# Patient Record
Sex: FEMALE | Race: WHITE | NOT HISPANIC OR LATINO | Employment: UNEMPLOYED | URBAN - METROPOLITAN AREA
[De-identification: names, ages, dates, MRNs, and addresses within clinical notes are randomized per-mention and may not be internally consistent; named-entity substitution may affect disease eponyms.]

---

## 2019-06-10 ENCOUNTER — TELEPHONE (OUTPATIENT)
Dept: OBSTETRICS AND GYNECOLOGY | Facility: CLINIC | Age: 44
End: 2019-06-10

## 2019-06-10 NOTE — TELEPHONE ENCOUNTER
----- Message from Tamika Lira sent at 6/10/2019  9:25 AM CDT -----  Contact: Pt Fiordaliza ( Mother )   Pt mother calling in to schedule her daughter an appointment for a Well Women Exam.   She can be reached at [phone] thanks

## 2019-06-10 NOTE — TELEPHONE ENCOUNTER
Spoke to patients mother and let her know that patient needs a referral to schedule an appointment due to her insurance. Patient is considered a new patient. Patients mother verbalized understanding.

## 2019-09-04 ENCOUNTER — TELEPHONE (OUTPATIENT)
Dept: OBSTETRICS AND GYNECOLOGY | Facility: CLINIC | Age: 44
End: 2019-09-04

## 2019-09-04 NOTE — TELEPHONE ENCOUNTER
----- Message from Lawanda Wheeler sent at 9/4/2019  4:21 PM CDT -----  Contact: Mom-Fiordaliza Weber is requesting call back in regards to complications with birthcontrol.          [facility] call back at [phone]

## 2019-09-17 ENCOUNTER — OFFICE VISIT (OUTPATIENT)
Dept: OBSTETRICS AND GYNECOLOGY | Facility: CLINIC | Age: 44
End: 2019-09-17
Payer: MEDICAID

## 2019-09-17 VITALS
SYSTOLIC BLOOD PRESSURE: 120 MMHG | BODY MASS INDEX: 33.88 KG/M2 | HEIGHT: 61 IN | WEIGHT: 179.44 LBS | DIASTOLIC BLOOD PRESSURE: 80 MMHG

## 2019-09-17 DIAGNOSIS — N94.6 DYSMENORRHEA: ICD-10-CM

## 2019-09-17 DIAGNOSIS — Z12.4 CERVICAL CANCER SCREENING: ICD-10-CM

## 2019-09-17 DIAGNOSIS — N93.9 ABNORMAL UTERINE BLEEDING: Primary | ICD-10-CM

## 2019-09-17 PROCEDURE — 99204 OFFICE O/P NEW MOD 45 MIN: CPT | Mod: 25,S$PBB,, | Performed by: OBSTETRICS & GYNECOLOGY

## 2019-09-17 PROCEDURE — 99213 OFFICE O/P EST LOW 20 MIN: CPT | Mod: PBBFAC,25 | Performed by: OBSTETRICS & GYNECOLOGY

## 2019-09-17 PROCEDURE — 88305 TISSUE EXAM BY PATHOLOGIST: CPT | Mod: 26,,, | Performed by: PATHOLOGY

## 2019-09-17 PROCEDURE — 88305 TISSUE EXAM BY PATHOLOGIST: CPT | Performed by: PATHOLOGY

## 2019-09-17 PROCEDURE — 58100 ENDOMETRIAL BIOPSY: ICD-10-PCS | Mod: S$PBB,,, | Performed by: OBSTETRICS & GYNECOLOGY

## 2019-09-17 PROCEDURE — 99999 PR PBB SHADOW E&M-EST. PATIENT-LVL III: CPT | Mod: PBBFAC,,, | Performed by: OBSTETRICS & GYNECOLOGY

## 2019-09-17 PROCEDURE — 58100 BIOPSY OF UTERUS LINING: CPT | Mod: PBBFAC | Performed by: OBSTETRICS & GYNECOLOGY

## 2019-09-17 PROCEDURE — 88305 TISSUE SPECIMEN TO PATHOLOGY, OBSTETRICS/GYNECOLOGY: ICD-10-PCS | Mod: 26,,, | Performed by: PATHOLOGY

## 2019-09-17 PROCEDURE — 99999 PR PBB SHADOW E&M-EST. PATIENT-LVL III: ICD-10-PCS | Mod: PBBFAC,,, | Performed by: OBSTETRICS & GYNECOLOGY

## 2019-09-17 PROCEDURE — 88175 CYTOPATH C/V AUTO FLUID REDO: CPT

## 2019-09-17 PROCEDURE — 87624 HPV HI-RISK TYP POOLED RSLT: CPT

## 2019-09-17 PROCEDURE — 99204 PR OFFICE/OUTPT VISIT, NEW, LEVL IV, 45-59 MIN: ICD-10-PCS | Mod: 25,S$PBB,, | Performed by: OBSTETRICS & GYNECOLOGY

## 2019-09-17 RX ORDER — CETIRIZINE HYDROCHLORIDE 10 MG/1
10 TABLET ORAL DAILY
Refills: 3 | COMMUNITY
Start: 2019-08-04

## 2019-09-17 RX ORDER — FLUTICASONE PROPIONATE 50 MCG
SPRAY, SUSPENSION (ML) NASAL
Refills: 3 | COMMUNITY
Start: 2019-08-14

## 2019-09-17 RX ORDER — CLONAZEPAM 0.5 MG/1
TABLET ORAL
Refills: 2 | COMMUNITY
Start: 2019-09-06

## 2019-09-17 RX ORDER — BUSPIRONE HYDROCHLORIDE 15 MG/1
15 TABLET ORAL 2 TIMES DAILY
Refills: 2 | COMMUNITY
Start: 2019-08-13 | End: 2020-02-18

## 2019-09-17 NOTE — PROCEDURES
Endometrial biopsy  Date/Time: 9/17/2019 11:46 AM  Performed by: Kimberly Tsang MD  Authorized by: Kimberly Tsang MD   Preparation: Patient was prepped and draped in the usual sterile fashion.  Local anesthesia used: no    Anesthesia:  Local anesthesia used: no    Sedation:  Patient sedated: no    Patient tolerance: Patient tolerated the procedure well with no immediate complications  Comments: Endometrial Biopsy Procedure Note    Name of Provider: Dr. Kimberly Tsang  Procedure Details  Urine pregnancy test negative  The risks (including infection, bleeding, pain, and uterine perforation) and benefits of the procedure were explained to the patient and Verbal informed consent was obtained.  Time out was performed.    The patient was placed in the dorsal lithotomy position.  Bimanual exam showed the uterus to be in the anteroflexed position.  A Graves' speculum inserted in the vagina, and the cervix prepped with povidone iodine.       A sharp tenaculum was applied to the anterior lip of the cervix for stabilization.  A Pipelle endometrial aspirator was used to sound the uterus to a depth of 10cm.  It was then used to sample the endometrium.  1 pass was made.  Sample was sent for pathologic examination.    Condition:  Stable    Complications:  None    Plan:    The patient was advised to call for any fever or for prolonged or severe pain or bleeding. She was advised to use OTC acetaminophen as needed for mild to moderate pain. She was advised to avoid vaginal intercourse for 48 hours or until the bleeding has completely stopped.      Specimen: Endometrial curettings    Post Op Diagnosis: Abnormal uterine bleeding

## 2019-09-17 NOTE — PATIENT INSTRUCTIONS
Endometrial Biopsy    Endometrial biopsy is a procedure used to study the endometrium (lining of the uterus). It is usually done in your health care providers office. During the biopsy, small tissue samples are taken from inside the uterus. These are then sent to a lab for study. If any problems are found, you and your health care provider will discuss treatment options. The biopsy usually takes only a few minutes, and you can often go back to your normal routine as soon as the procedure is over.  Reasons for the procedure  Endometrial biopsy may help pinpoint the cause of certain problems. These include:  · Bleeding after menopause  · Heavy or irregular menstrual periods  · Bleeding associated with hormone therapy  · Prolonged bleeding  · Abnormal Pap test results  · Having certain types of cancer  · Trouble getting pregnant (fertility problems)  What are the risks?  Problems with endometrial biopsy are rare, but can include:  · Bleeding  · Infection  · Damage to the uterine wall (very rare)  Getting ready for the procedure  Your health care provider will ask about your health and any medicines you take, like blood thinners. Before your biopsy, you may have tests to make sure youre not pregnant or have an infection. You may also be asked to sign a consent form. A day or 2 before the procedure:   · Avoid using creams or other vaginal medicines.  · Avoid douching.  · Ask your health care provider if you should take pain medicines shortly before the test.  During the biopsy  During the biopsy, you will likely experience the following:  · You will be asked to lie on an exam table with your knees bent, just as you do for a Pap test.  · You may have a brief pelvic exam. An instrument called a speculum is then inserted into the vagina to hold it open.  · An antiseptic solution may be applied to the cervix. The cervix may also be numbed with an anesthetic or dilated to widen the opening.  · A small tube is passed  through the cervix into the uterus.  · It is normal to feel some cramping when the tube is inserted. But tell your health care provider if you have severe cramping or are very uncomfortable.  · Using mild suction, samples are taken from the uterine lining. You may feel pinching or additional cramping when this is done.  · The tube and speculum are then removed and the samples are sent to a lab for study.  After the procedure  After the procedure, you may experience the following:  · If you feel lightheaded or dizzy, you can rest on the table until youre ready to get dressed.  · For a few hours, you may feel some mild cramping. This can usually be relieved with over-the-counter pain medicines.  · You may have some bleeding for a few days. Use pads instead of tampons.  · Dont douche or use any vaginal medicines unless your health care provider says its OK.  · Ask your health care provider when its OK to have sex again.  Follow-up care  It will take about a week for the biopsy results to come back from the lab. Then you and your health care provider can discuss the results. These may show that no treatment is required. Or, you may be scheduled for a follow-up appointment and more tests. If your biopsy was done for fertility problems, be sure to record the day when your next period begins.     Call your health care provider   Contact your health care provider if you have any of the following:  · Heavy bleeding (more than a pad an hour for 2 hours).  · Severe cramping or increasing pain.  · Fever over 100.4°F (38.0°C)  · Foul-smelling or unusual vaginal discharge.   Date Last Reviewed: 5/10/2015  © 3368-9735 [employer]. [address] [place] [address], [place] [address]. All rights reserved. This information is not intended as a substitute for professional medical care. Always follow your healthcare professional's instructions.

## 2019-09-17 NOTE — PROGRESS NOTES
Subjective:       Patient ID: Martha Kemp is a 44 y.o. female.    Chief Complaint:  Metrorrhagia      History of Present Illness  HPI  Presents with her mom for irregular periods and pelvic pain.  The patient had been having regular, monthly periods until 3 months ago.  She now is having intermenstrual spotting along with pelvic pain on days she has the bleeding.  She is not currently sexually active.  Her operative history was reviewed in detail.  Essentially, she has an Essure coil in the right fallopian tube, then subsequent tubal ligation of the left fallopian tube during .  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    Last pap: several years ago: normal  States she had a [facility] within the last year at [facility]    GYN & OB History  Patient's last menstrual period was 09/10/2019.   Date of Last Pap: No result found    OB History    Para Term  AB Living   1 1 1     1   SAB TAB Ectopic Multiple Live Births           1      # Outcome Date GA Lbr Lauri/2nd Weight Sex Delivery Anes PTL Lv   1 Term 2014     CS-LTranv   RE       Review of Systems  Review of Systems   Constitutional: Negative for fatigue, fever and unexpected weight change.   Gastrointestinal: Negative for abdominal pain, constipation, diarrhea, nausea and vomiting.   Genitourinary: Positive for dysmenorrhea, menstrual problem and pelvic pain. Negative for dysuria, frequency, urgency, vaginal bleeding, vaginal discharge, vaginal pain and vaginal odor.           Objective:    Physical Exam:   Constitutional: She is oriented to person, place, and time. She appears well-developed and well-nourished. No distress.             Abdominal: Soft. She exhibits no distension and no mass. There is no tenderness. There is no rebound and no guarding. Hernia confirmed negative in the right inguinal area and confirmed negative in the left inguinal area.     Genitourinary: Vagina normal.  Pelvic exam was performed with patient supine. There is no rash, tenderness, lesion or injury on the right labia. There is no rash, tenderness, lesion or injury on the left labia. Uterus is not deviated, not enlarged, not fixed, not tender and not experiencing uterine prolapse. Right adnexum displays no mass, no tenderness and no fullness. Left adnexum displays no mass, no tenderness and no fullness. No erythema, tenderness, bleeding, rectocele, cystocele or unspecified prolapse of vaginal walls in the vagina. No foreign body in the vagina. No signs of injury around the vagina. No vaginal discharge found. Cervix exhibits no motion tenderness, no discharge and no friability. Additional cervical findings: pap smear done       Uterus Size: 10 cm       Neurological: She is alert and oriented to person, place, and time.     Psychiatric: She has a normal mood and affect.        UPT: negative  Assessment:        1. Abnormal uterine bleeding    2. Dysmenorrhea    3. Cervical cancer screening                Plan:      Martha was seen today for metrorrhagia.    Diagnoses and all orders for this visit:    Abnormal uterine bleeding  -     Tissue Specimen To Pathology, Obstetrics/Gynecology  -     Endometrial biopsy  -     US Pelvis Complete Non OB; Future    Dysmenorrhea  -     US Pelvis Complete Non OB; Future    Cervical cancer screening  -     Liquid-based pap smear, screening  -     HPV High Risk Genotypes, PCR    EMB done today.  RTC with her mother to review EMB and ultrasound results, and to discuss further management of her symptoms.

## 2019-09-20 LAB
HPV HR 12 DNA CVX QL NAA+PROBE: NEGATIVE
HPV16 AG SPEC QL: NEGATIVE
HPV18 DNA SPEC QL NAA+PROBE: NEGATIVE

## 2019-09-27 ENCOUNTER — TELEPHONE (OUTPATIENT)
Dept: RADIOLOGY | Facility: HOSPITAL | Age: 44
End: 2019-09-27

## 2019-09-30 ENCOUNTER — HOSPITAL ENCOUNTER (OUTPATIENT)
Dept: RADIOLOGY | Facility: HOSPITAL | Age: 44
Discharge: HOME | End: 2019-09-30
Attending: OBSTETRICS & GYNECOLOGY
Payer: MEDICAID

## 2019-09-30 ENCOUNTER — TELEPHONE (OUTPATIENT)
Dept: OBSTETRICS AND GYNECOLOGY | Facility: CLINIC | Age: 44
End: 2019-09-30

## 2019-09-30 DIAGNOSIS — N93.9 ABNORMAL UTERINE BLEEDING: ICD-10-CM

## 2019-09-30 DIAGNOSIS — N94.6 DYSMENORRHEA: ICD-10-CM

## 2019-09-30 PROCEDURE — 76830 TRANSVAGINAL US NON-OB: CPT | Mod: TC

## 2019-09-30 PROCEDURE — 76856 US EXAM PELVIC COMPLETE: CPT | Mod: 26,,, | Performed by: RADIOLOGY

## 2019-09-30 PROCEDURE — 76856 US PELVIS COMP WITH TRANSVAG NON-OB (XPD): ICD-10-PCS | Mod: 26,,, | Performed by: RADIOLOGY

## 2019-09-30 PROCEDURE — 76830 TRANSVAGINAL US NON-OB: CPT | Mod: 26,,, | Performed by: RADIOLOGY

## 2019-09-30 PROCEDURE — 76830 US PELVIS COMP WITH TRANSVAG NON-OB (XPD): ICD-10-PCS | Mod: 26,,, | Performed by: RADIOLOGY

## 2019-09-30 NOTE — TELEPHONE ENCOUNTER
Pt came in today for an ultrasound. She states she started spotting earlier and wanted to consult with a doctor to see if she need to be seen sooner. Pt is s

## 2019-10-10 ENCOUNTER — OFFICE VISIT (OUTPATIENT)
Dept: OBSTETRICS AND GYNECOLOGY | Facility: CLINIC | Age: 44
End: 2019-10-10
Payer: MEDICAID

## 2019-10-10 VITALS
SYSTOLIC BLOOD PRESSURE: 124 MMHG | HEIGHT: 61 IN | DIASTOLIC BLOOD PRESSURE: 72 MMHG | WEIGHT: 180.56 LBS | BODY MASS INDEX: 34.09 KG/M2

## 2019-10-10 DIAGNOSIS — N94.6 DYSMENORRHEA: ICD-10-CM

## 2019-10-10 DIAGNOSIS — N83.292 COMPLEX CYST OF LEFT OVARY: ICD-10-CM

## 2019-10-10 DIAGNOSIS — N93.9 ABNORMAL UTERINE BLEEDING: Primary | ICD-10-CM

## 2019-10-10 PROCEDURE — 99212 OFFICE O/P EST SF 10 MIN: CPT | Mod: S$PBB,,, | Performed by: OBSTETRICS & GYNECOLOGY

## 2019-10-10 PROCEDURE — 99999 PR PBB SHADOW E&M-EST. PATIENT-LVL III: CPT | Mod: PBBFAC,,, | Performed by: OBSTETRICS & GYNECOLOGY

## 2019-10-10 PROCEDURE — 99999 PR PBB SHADOW E&M-EST. PATIENT-LVL III: ICD-10-PCS | Mod: PBBFAC,,, | Performed by: OBSTETRICS & GYNECOLOGY

## 2019-10-10 PROCEDURE — 99213 OFFICE O/P EST LOW 20 MIN: CPT | Mod: PBBFAC | Performed by: OBSTETRICS & GYNECOLOGY

## 2019-10-10 PROCEDURE — 99212 PR OFFICE/OUTPT VISIT, EST, LEVL II, 10-19 MIN: ICD-10-PCS | Mod: S$PBB,,, | Performed by: OBSTETRICS & GYNECOLOGY

## 2019-10-10 RX ORDER — MEDROXYPROGESTERONE ACETATE 10 MG/1
10 TABLET ORAL DAILY
Qty: 10 TABLET | Refills: 2 | Status: SHIPPED | OUTPATIENT
Start: 2019-10-10 | End: 2020-02-18

## 2019-10-10 NOTE — PROGRESS NOTES
"  Subjective:       Patient ID: Martha Kemp is a 44 y.o. female.    Chief Complaint:  Follow-up      History of Present Illness  HPI  Presents to follow-up results.   To recap, at her last visit: "Presents with her mom for irregular periods and pelvic pain.  The patient had been having regular, monthly periods until 3 months ago.  She now is having intermenstrual spotting along with pelvic pain on days she has the bleeding.  She is not currently sexually active.  Her operative history was reviewed in detail.  Essentially, she has an Essure coil in the right fallopian tube, then subsequent tubal ligation of the left fallopian tube during .  She has significant pelvic adhesive disease on operative findings."    EMB at her last visit was benign with glandular breakdown  Pelvic ultrasound with 2.9 cm intramural fibroid, and 5.6 cm left ovarian cyst c/w hemorrhagic cyst    GYN & OB History  Patient's last menstrual period was 09/10/2019.   Date of Last Pap: 2019    OB History    Para Term  AB Living   1 1 1     1   SAB TAB Ectopic Multiple Live Births           1      # Outcome Date GA Lbr Lauri/2nd Weight Sex Delivery Anes PTL Lv   1 Term 2014     CS-LTranv   RE       Review of Systems  Review of Systems   Constitutional: Negative for fatigue, fever and unexpected weight change.   Gastrointestinal: Negative for abdominal pain, constipation, diarrhea, nausea and vomiting.   Genitourinary: Positive for menstrual problem. Negative for dysuria, frequency, urgency, vaginal bleeding, vaginal discharge, vaginal pain, postcoital bleeding and vaginal odor.           Objective:    Physical Exam:   Constitutional: She is oriented to person, place, and time. She appears well-developed and well-nourished. No distress.                           Neurological: She is alert and oriented to person, place, and time.    Skin: No rash noted. No erythema. No pallor.    Psychiatric:   Labile affect        "   Assessment:        1. Abnormal uterine bleeding    2. Dysmenorrhea    3. Complex cyst of left ovary                Plan:      Martha was seen today for follow-up.    Diagnoses and all orders for this visit:    Abnormal uterine bleeding  -     medroxyPROGESTERone (PROVERA) 10 MG tablet; Take 1 tablet (10 mg total) by mouth once daily. for 10 days    Dysmenorrhea    Complex cyst of left ovary  -     US Pelvis Complete Non OB; Future    Reviewed results with the patient and her mother.  Discussed dysfunctional uterine bleeding.  Reviewed options for cyclic provera versus OCP's.  Patient does not want birth control, but is willing to try cyclic provera.  Will do that x 3 months, then RTC with me.  Repeat ultrasound 6-8 weeks from first ultrasound.

## 2019-11-19 ENCOUNTER — TELEPHONE (OUTPATIENT)
Dept: OBSTETRICS AND GYNECOLOGY | Facility: CLINIC | Age: 44
End: 2019-11-19

## 2019-11-19 NOTE — TELEPHONE ENCOUNTER
----- Message from Mansoor Esteves sent at 11/19/2019  9:08 AM CST -----  Contact: Bia-mother  Type:  Sooner Apoointment Request    Caller is requesting a sooner appointment.  Caller declined first available appointment listed below.  Caller will not accept being placed on the waitlist and is requesting a message be sent to doctor.  Name of Caller: Bia  When is the first available appointment? 05/06/20  Symptoms: f/u  Would the patient rather a call back or a response via [facility]? Call back  Best Call Back Number: [phone]  Additional Information: n/a

## 2019-11-19 NOTE — TELEPHONE ENCOUNTER
Returned pt call, spoke with Ms. Montiel (pt's mother) who stated that she had to cancel an appt for her daughter w/ Dr. Tsang to attend an urgent appt with their optometrist. Ms. Montiel was concerned that their appt on 2/6/20 was too late for a follow up. Informed pt's mother that I would check with Dr. Tsang and see if she needs to be seen sooner. Pt verbalized understanding.

## 2019-11-20 ENCOUNTER — TELEPHONE (OUTPATIENT)
Dept: OBSTETRICS AND GYNECOLOGY | Facility: CLINIC | Age: 44
End: 2019-11-20

## 2019-11-20 NOTE — TELEPHONE ENCOUNTER
Returned call.  Mother Bia answered and scheduled ultrasound appt for 12/11/19 at 10:15 am.  Ultrasound instructions given, she verbalized understanding and confirmed appt along with location.

## 2019-11-20 NOTE — TELEPHONE ENCOUNTER
----- Message from Claudia Mcarthur sent at 11/20/2019  2:40 PM CST -----  Contact: Patient  Type:  Patient Returning Call    Who Called:  Patient  Who Left Message for Patient:  Jesica  Does the patient know what this is regarding?:  no  Best Call Back Number: [phone] (home) [phone] (work)   Additional Information:  Patient states that she is scheduled and is not sure why she is receiving calls. Thank you!

## 2019-12-10 ENCOUNTER — TELEPHONE (OUTPATIENT)
Dept: RADIOLOGY | Facility: HOSPITAL | Age: 44
End: 2019-12-10

## 2019-12-11 ENCOUNTER — HOSPITAL ENCOUNTER (OUTPATIENT)
Dept: RADIOLOGY | Facility: HOSPITAL | Age: 44
Discharge: HOME | End: 2019-12-11
Attending: OBSTETRICS & GYNECOLOGY
Payer: MEDICAID

## 2019-12-11 DIAGNOSIS — N83.292 COMPLEX CYST OF LEFT OVARY: ICD-10-CM

## 2020-02-03 ENCOUNTER — TELEPHONE (OUTPATIENT)
Dept: RADIOLOGY | Facility: HOSPITAL | Age: 45
End: 2020-02-03

## 2020-02-04 ENCOUNTER — APPOINTMENT (OUTPATIENT)
Dept: RADIOLOGY | Facility: HOSPITAL | Age: 45
End: 2020-02-04
Attending: OBSTETRICS & GYNECOLOGY
Payer: MEDICAID

## 2020-02-04 PROCEDURE — 76830 TRANSVAGINAL US NON-OB: CPT | Mod: TC,PO

## 2020-02-04 PROCEDURE — 76856 US PELVIS COMP WITH TRANSVAG NON-OB (XPD): ICD-10-PCS | Mod: 26,,, | Performed by: RADIOLOGY

## 2020-02-04 PROCEDURE — 76830 TRANSVAGINAL US NON-OB: CPT | Mod: 26,,, | Performed by: RADIOLOGY

## 2020-02-04 PROCEDURE — 76830 US PELVIS COMP WITH TRANSVAG NON-OB (XPD): ICD-10-PCS | Mod: 26,,, | Performed by: RADIOLOGY

## 2020-02-04 PROCEDURE — 76856 US EXAM PELVIC COMPLETE: CPT | Mod: 26,,, | Performed by: RADIOLOGY

## 2020-02-05 ENCOUNTER — TELEPHONE (OUTPATIENT)
Dept: OBSTETRICS AND GYNECOLOGY | Facility: CLINIC | Age: 45
End: 2020-02-05

## 2020-02-05 DIAGNOSIS — N83.201 RIGHT OVARIAN CYST: Primary | ICD-10-CM

## 2020-02-05 PROBLEM — N83.292 COMPLEX CYST OF LEFT OVARY: Status: RESOLVED | Noted: 2019-10-10 | Resolved: 2020-02-05

## 2020-02-05 NOTE — TELEPHONE ENCOUNTER
Pelvic ultrasound shows resolution of the left ovarian cyst.  Now has a new cyst on the right ovary.  Please notify.  Needs repeat ultrasound in 8 weeks.

## 2020-02-07 NOTE — TELEPHONE ENCOUNTER
Mother answered the phone and stated that she was called yesterday and given the results of patient's ultrasound.

## 2020-02-18 ENCOUNTER — OFFICE VISIT (OUTPATIENT)
Dept: OBSTETRICS AND GYNECOLOGY | Facility: CLINIC | Age: 45
End: 2020-02-18
Payer: MEDICAID

## 2020-02-18 VITALS
DIASTOLIC BLOOD PRESSURE: 72 MMHG | BODY MASS INDEX: 37.17 KG/M2 | HEIGHT: 61 IN | SYSTOLIC BLOOD PRESSURE: 118 MMHG | WEIGHT: 196.88 LBS

## 2020-02-18 DIAGNOSIS — Z12.39 BREAST CANCER SCREENING: ICD-10-CM

## 2020-02-18 DIAGNOSIS — N94.6 DYSMENORRHEA: ICD-10-CM

## 2020-02-18 DIAGNOSIS — N83.201 RIGHT OVARIAN CYST: Primary | ICD-10-CM

## 2020-02-18 PROBLEM — N93.9 ABNORMAL UTERINE BLEEDING: Status: RESOLVED | Noted: 2019-09-17 | Resolved: 2020-02-18

## 2020-02-18 PROCEDURE — 99212 PR OFFICE/OUTPT VISIT, EST, LEVL II, 10-19 MIN: ICD-10-PCS | Mod: S$PBB,,, | Performed by: OBSTETRICS & GYNECOLOGY

## 2020-02-18 PROCEDURE — 99212 OFFICE O/P EST SF 10 MIN: CPT | Mod: S$PBB,,, | Performed by: OBSTETRICS & GYNECOLOGY

## 2020-02-18 PROCEDURE — 99999 PR PBB SHADOW E&M-EST. PATIENT-LVL III: CPT | Mod: PBBFAC,,, | Performed by: OBSTETRICS & GYNECOLOGY

## 2020-02-18 PROCEDURE — 99999 PR PBB SHADOW E&M-EST. PATIENT-LVL III: ICD-10-PCS | Mod: PBBFAC,,, | Performed by: OBSTETRICS & GYNECOLOGY

## 2020-02-18 PROCEDURE — 99213 OFFICE O/P EST LOW 20 MIN: CPT | Mod: PBBFAC | Performed by: OBSTETRICS & GYNECOLOGY

## 2020-02-18 RX ORDER — VENLAFAXINE HYDROCHLORIDE 37.5 MG/1
CAPSULE, EXTENDED RELEASE ORAL
COMMUNITY
Start: 2020-02-12

## 2020-02-18 NOTE — PROGRESS NOTES
"  Subjective:       Patient ID: Martha Kemp is a 44 y.o. female.    Chief Complaint:  Follow-up      History of Present Illness  HPI  Presents to follow-up pelvic ultrasound and irregular bleeding.     To recap:  "Presented with her mom for irregular periods and pelvic pain.  The patient had been having regular, monthly periods until 3 months ago.  She now is having intermenstrual spotting along with pelvic pain on days she has the bleeding.  She is not currently sexually active.  Her operative history was reviewed in detail.  Essentially, she has an Essure coil in the right fallopian tube, then subsequent tubal ligation of the left fallopian tube during .  She has significant pelvic adhesive disease on operative findings     EMB was benign with glandular breakdown  Pelvic ultrasound with 2.9 cm intramural fibroid, and 5.6 cm left ovarian cyst c/w hemorrhagic cyst"    Since then, she started cyclic provera for her irregular bleeding, and had an excellent response with it.  Repeat ultrasound showed resolution of the left ovarian cyst, but she had a new 6cm hemorrhagic cyst on the right ovary.  Has repeat ultrasound scheduled 20.    Today she feels well.  Denies any current pain or bleeding.    GYN & OB History  Patient's last menstrual period was 2020 (within days).   Date of Last Pap: 2019    OB History    Para Term  AB Living   1 1 1     1   SAB TAB Ectopic Multiple Live Births           1      # Outcome Date GA Lbr Lauri/2nd Weight Sex Delivery Anes PTL Lv   1 Term 2014     CS-LTranv   RE       Review of Systems  Review of Systems   Constitutional: Negative for fatigue, fever and unexpected weight change.   Gastrointestinal: Negative for abdominal pain, constipation, diarrhea, nausea and vomiting.   Genitourinary: Positive for dysmenorrhea. Negative for dysuria, frequency, menorrhagia, menstrual problem, pelvic pain, urgency, vaginal bleeding, vaginal discharge, " vaginal pain, postcoital bleeding and vaginal odor.           Objective:    Physical Exam:   Constitutional: She is oriented to person, place, and time. She appears well-developed and well-nourished. No distress.    HENT:   Head: Normocephalic and atraumatic.     Neck: Neck supple. No thyromegaly present.     Pulmonary/Chest: Effort normal.        Abdominal: Soft. She exhibits no distension and no mass. There is no tenderness. There is no rebound and no guarding.             Musculoskeletal: She exhibits no edema.       Neurological: She is alert and oriented to person, place, and time.    Skin: No rash noted.    Psychiatric: She has a normal mood and affect. Her behavior is normal. Judgment and thought content normal.          Assessment:        1. Right ovarian cyst    2. Dysmenorrhea    3. Breast cancer screening                Plan:      Martha was seen today for follow-up.    Diagnoses and all orders for this visit:    Right ovarian cyst    Dysmenorrhea    Breast cancer screening  -     Mammo Digital Screening Bilat; Future    Reviewed resolution of left ovarian cyst, and reviewed new right ovarian cyst.  Repeat ultrasound as scheduled on 4/2/20 to follow the right cyst.  She declines hormonal contraception in the past for cyst prevention and cycle regulation.  Irregular bleeding responded well to cyclic provera x 3 months.  Pt would like to try to stop the provera, and see how her periods do off it.  I think that is reasonable.  If irregular bleeding resumes, she can call the office, and will get her back on cyclic provera.    RTC prn.

## 2020-03-26 ENCOUNTER — TELEPHONE (OUTPATIENT)
Dept: RADIOLOGY | Facility: HOSPITAL | Age: 45
End: 2020-03-26

## 2020-05-11 ENCOUNTER — HOSPITAL ENCOUNTER (OUTPATIENT)
Dept: RADIOLOGY | Facility: HOSPITAL | Age: 45
Discharge: HOME | End: 2020-05-11
Attending: OBSTETRICS & GYNECOLOGY
Payer: MEDICAID

## 2020-05-11 VITALS — HEIGHT: 61 IN | WEIGHT: 196.88 LBS | BODY MASS INDEX: 37.17 KG/M2

## 2020-05-11 DIAGNOSIS — Z12.39 BREAST CANCER SCREENING: ICD-10-CM

## 2020-05-11 PROCEDURE — 77067 SCR MAMMO BI INCL CAD: CPT | Mod: TC

## 2020-05-11 PROCEDURE — 77063 MAMMO DIGITAL SCREENING BILAT WITH TOMOSYNTHESIS_CAD: ICD-10-PCS | Mod: 26,,, | Performed by: RADIOLOGY

## 2020-05-11 PROCEDURE — 77063 BREAST TOMOSYNTHESIS BI: CPT | Mod: 26,,, | Performed by: RADIOLOGY

## 2020-05-11 PROCEDURE — 77067 MAMMO DIGITAL SCREENING BILAT WITH TOMOSYNTHESIS_CAD: ICD-10-PCS | Mod: 26,,, | Performed by: RADIOLOGY

## 2020-05-11 PROCEDURE — 77067 SCR MAMMO BI INCL CAD: CPT | Mod: 26,,, | Performed by: RADIOLOGY

## 2020-05-18 ENCOUNTER — HOSPITAL ENCOUNTER (OUTPATIENT)
Dept: RADIOLOGY | Facility: HOSPITAL | Age: 45
Discharge: HOME | End: 2020-05-18
Attending: OBSTETRICS & GYNECOLOGY
Payer: MEDICAID

## 2020-05-18 DIAGNOSIS — R92.8 ABNORMAL MAMMOGRAM: ICD-10-CM

## 2020-05-18 PROCEDURE — 77061 BREAST TOMOSYNTHESIS UNI: CPT | Mod: 26,,, | Performed by: RADIOLOGY

## 2020-05-18 PROCEDURE — 76642 ULTRASOUND BREAST LIMITED: CPT | Mod: 26,RT,, | Performed by: RADIOLOGY

## 2020-05-18 PROCEDURE — 77061 MAMMO DIGITAL DIAGNOSTIC RIGHT WITH TOMOSYNTHESIS_CAD: ICD-10-PCS | Mod: 26,,, | Performed by: RADIOLOGY

## 2020-05-18 PROCEDURE — 77065 DX MAMMO INCL CAD UNI: CPT | Mod: 26,,, | Performed by: RADIOLOGY

## 2020-05-18 PROCEDURE — 76642 US BREAST RIGHT LIMITED: ICD-10-PCS | Mod: 26,RT,, | Performed by: RADIOLOGY

## 2020-05-18 PROCEDURE — 77065 DX MAMMO INCL CAD UNI: CPT | Mod: TC

## 2020-05-18 PROCEDURE — 76642 ULTRASOUND BREAST LIMITED: CPT | Mod: TC,RT

## 2020-05-18 PROCEDURE — 77065 MAMMO DIGITAL DIAGNOSTIC RIGHT WITH TOMOSYNTHESIS_CAD: ICD-10-PCS | Mod: 26,,, | Performed by: RADIOLOGY

## 2021-03-19 ENCOUNTER — TELEPHONE (OUTPATIENT)
Dept: OBSTETRICS AND GYNECOLOGY | Facility: CLINIC | Age: 46
End: 2021-03-19

## 2021-03-19 DIAGNOSIS — N83.201 RIGHT OVARIAN CYST: Primary | ICD-10-CM

## 2021-04-12 ENCOUNTER — TELEPHONE (OUTPATIENT)
Dept: OBSTETRICS AND GYNECOLOGY | Facility: CLINIC | Age: 46
End: 2021-04-12

## 2021-04-13 ENCOUNTER — TELEPHONE (OUTPATIENT)
Dept: OBSTETRICS AND GYNECOLOGY | Facility: CLINIC | Age: 46
End: 2021-04-13

## 2021-04-19 ENCOUNTER — TELEPHONE (OUTPATIENT)
Dept: RADIOLOGY | Facility: HOSPITAL | Age: 46
End: 2021-04-19

## 2021-04-20 ENCOUNTER — HOSPITAL ENCOUNTER (OUTPATIENT)
Dept: RADIOLOGY | Facility: HOSPITAL | Age: 46
Discharge: HOME | End: 2021-04-20
Attending: OBSTETRICS & GYNECOLOGY
Payer: MEDICAID

## 2021-04-20 DIAGNOSIS — N83.201 RIGHT OVARIAN CYST: ICD-10-CM

## 2021-04-20 PROCEDURE — 76830 TRANSVAGINAL US NON-OB: CPT | Mod: 26,,, | Performed by: RADIOLOGY

## 2021-04-20 PROCEDURE — 76856 US EXAM PELVIC COMPLETE: CPT | Mod: TC

## 2021-04-20 PROCEDURE — 76830 US PELVIS COMP WITH TRANSVAG NON-OB (XPD): ICD-10-PCS | Mod: 26,,, | Performed by: RADIOLOGY

## 2021-04-20 PROCEDURE — 76856 US PELVIS COMP WITH TRANSVAG NON-OB (XPD): ICD-10-PCS | Mod: 26,,, | Performed by: RADIOLOGY

## 2021-04-20 PROCEDURE — 76856 US EXAM PELVIC COMPLETE: CPT | Mod: 26,,, | Performed by: RADIOLOGY

## 2021-05-06 ENCOUNTER — OFFICE VISIT (OUTPATIENT)
Dept: OBSTETRICS AND GYNECOLOGY | Facility: CLINIC | Age: 46
End: 2021-05-06
Payer: MEDICAID

## 2021-05-06 VITALS
BODY MASS INDEX: 42.13 KG/M2 | SYSTOLIC BLOOD PRESSURE: 116 MMHG | HEART RATE: 81 BPM | DIASTOLIC BLOOD PRESSURE: 82 MMHG | HEIGHT: 61 IN | WEIGHT: 223.13 LBS

## 2021-05-06 DIAGNOSIS — Z01.419 WELL WOMAN EXAM WITH ROUTINE GYNECOLOGICAL EXAM: Primary | ICD-10-CM

## 2021-05-06 DIAGNOSIS — L30.4 INTERTRIGO: ICD-10-CM

## 2021-05-06 DIAGNOSIS — Z12.31 ENCOUNTER FOR SCREENING MAMMOGRAM FOR MALIGNANT NEOPLASM OF BREAST: ICD-10-CM

## 2021-05-06 DIAGNOSIS — N93.8 DYSFUNCTIONAL UTERINE BLEEDING: ICD-10-CM

## 2021-05-06 PROBLEM — N83.201 RIGHT OVARIAN CYST: Status: RESOLVED | Noted: 2020-02-05 | Resolved: 2021-05-06

## 2021-05-06 PROCEDURE — 99396 PREV VISIT EST AGE 40-64: CPT | Mod: S$PBB,,, | Performed by: OBSTETRICS & GYNECOLOGY

## 2021-05-06 PROCEDURE — 99213 OFFICE O/P EST LOW 20 MIN: CPT | Mod: PBBFAC | Performed by: OBSTETRICS & GYNECOLOGY

## 2021-05-06 PROCEDURE — 99999 PR PBB SHADOW E&M-EST. PATIENT-LVL III: CPT | Mod: PBBFAC,,, | Performed by: OBSTETRICS & GYNECOLOGY

## 2021-05-06 PROCEDURE — 99396 PR PREVENTIVE VISIT,EST,40-64: ICD-10-PCS | Mod: S$PBB,,, | Performed by: OBSTETRICS & GYNECOLOGY

## 2021-05-06 PROCEDURE — 99999 PR PBB SHADOW E&M-EST. PATIENT-LVL III: ICD-10-PCS | Mod: PBBFAC,,, | Performed by: OBSTETRICS & GYNECOLOGY

## 2021-05-06 RX ORDER — CLOTRIMAZOLE AND BETAMETHASONE DIPROPIONATE 10; .64 MG/G; MG/G
CREAM TOPICAL
Qty: 45 G | Refills: 2 | Status: SHIPPED | OUTPATIENT
Start: 2021-05-06 | End: 2022-05-06

## 2021-05-06 RX ORDER — FLUCONAZOLE 150 MG/1
150 TABLET ORAL
Qty: 2 TABLET | Refills: 0 | Status: SHIPPED | OUTPATIENT
Start: 2021-05-06 | End: 2021-05-10

## 2021-05-06 RX ORDER — MEDROXYPROGESTERONE ACETATE 10 MG/1
10 TABLET ORAL DAILY
Qty: 12 TABLET | Refills: 11 | Status: SHIPPED | OUTPATIENT
Start: 2021-05-06 | End: 2021-05-18

## 2021-06-03 ENCOUNTER — HOSPITAL ENCOUNTER (OUTPATIENT)
Dept: RADIOLOGY | Facility: HOSPITAL | Age: 46
Discharge: HOME | End: 2021-06-03
Attending: OBSTETRICS & GYNECOLOGY
Payer: MEDICAID

## 2021-06-03 VITALS — WEIGHT: 223.13 LBS | BODY MASS INDEX: 42.13 KG/M2 | HEIGHT: 61 IN

## 2021-06-03 DIAGNOSIS — Z12.31 ENCOUNTER FOR SCREENING MAMMOGRAM FOR MALIGNANT NEOPLASM OF BREAST: ICD-10-CM

## 2021-06-03 PROCEDURE — 77067 MAMMO DIGITAL SCREENING BILAT WITH TOMO: ICD-10-PCS | Mod: 26,,, | Performed by: RADIOLOGY

## 2021-06-03 PROCEDURE — 77067 SCR MAMMO BI INCL CAD: CPT | Mod: 26,,, | Performed by: RADIOLOGY

## 2021-06-03 PROCEDURE — 77063 BREAST TOMOSYNTHESIS BI: CPT | Mod: 26,,, | Performed by: RADIOLOGY

## 2021-06-03 PROCEDURE — 77067 SCR MAMMO BI INCL CAD: CPT | Mod: TC

## 2021-06-03 PROCEDURE — 77063 MAMMO DIGITAL SCREENING BILAT WITH TOMO: ICD-10-PCS | Mod: 26,,, | Performed by: RADIOLOGY
